# Patient Record
Sex: MALE | Race: WHITE | NOT HISPANIC OR LATINO | Employment: FULL TIME | URBAN - NONMETROPOLITAN AREA
[De-identification: names, ages, dates, MRNs, and addresses within clinical notes are randomized per-mention and may not be internally consistent; named-entity substitution may affect disease eponyms.]

---

## 2024-09-02 ENCOUNTER — APPOINTMENT (OUTPATIENT)
Dept: GENERAL RADIOLOGY | Facility: HOSPITAL | Age: 39
End: 2024-09-02
Payer: COMMERCIAL

## 2024-09-02 ENCOUNTER — HOSPITAL ENCOUNTER (EMERGENCY)
Facility: HOSPITAL | Age: 39
Discharge: HOME | End: 2024-09-02
Attending: STUDENT IN AN ORGANIZED HEALTH CARE EDUCATION/TRAINING PROGRAM
Payer: COMMERCIAL

## 2024-09-02 VITALS
OXYGEN SATURATION: 98 % | RESPIRATION RATE: 20 BRPM | HEART RATE: 94 BPM | DIASTOLIC BLOOD PRESSURE: 72 MMHG | WEIGHT: 215 LBS | SYSTOLIC BLOOD PRESSURE: 116 MMHG | TEMPERATURE: 98.7 F | HEIGHT: 72 IN | BODY MASS INDEX: 29.12 KG/M2

## 2024-09-02 DIAGNOSIS — J06.9 VIRAL URI WITH COUGH: Primary | ICD-10-CM

## 2024-09-02 LAB
ALBUMIN SERPL-MCNC: 3.6 G/DL (ref 3.5–5.2)
ALBUMIN/GLOB SERPL: 1.2 G/DL
ALP SERPL-CCNC: 67 U/L (ref 39–117)
ALT SERPL W P-5'-P-CCNC: 31 U/L (ref 1–41)
ANION GAP SERPL CALCULATED.3IONS-SCNC: 11.6 MMOL/L (ref 5–15)
AST SERPL-CCNC: 23 U/L (ref 1–40)
BACTERIA UR QL AUTO: ABNORMAL /HPF
BASOPHILS # BLD AUTO: 0.05 10*3/MM3 (ref 0–0.2)
BASOPHILS NFR BLD AUTO: 0.4 % (ref 0–1.5)
BILIRUB SERPL-MCNC: 0.6 MG/DL (ref 0–1.2)
BILIRUB UR QL STRIP: ABNORMAL
BUN SERPL-MCNC: 10 MG/DL (ref 6–20)
BUN/CREAT SERPL: 9.3 (ref 7–25)
CALCIUM SPEC-SCNC: 8.3 MG/DL (ref 8.6–10.5)
CHLORIDE SERPL-SCNC: 102 MMOL/L (ref 98–107)
CLARITY UR: CLEAR
CO2 SERPL-SCNC: 21.4 MMOL/L (ref 22–29)
COLOR UR: ABNORMAL
CREAT SERPL-MCNC: 1.07 MG/DL (ref 0.76–1.27)
CRP SERPL-MCNC: 10.19 MG/DL (ref 0–0.5)
D-LACTATE SERPL-SCNC: 1.4 MMOL/L (ref 0.5–2)
DEPRECATED RDW RBC AUTO: 42.3 FL (ref 37–54)
EGFRCR SERPLBLD CKD-EPI 2021: 91.1 ML/MIN/1.73
EOSINOPHIL # BLD AUTO: 0.02 10*3/MM3 (ref 0–0.4)
EOSINOPHIL NFR BLD AUTO: 0.2 % (ref 0.3–6.2)
ERYTHROCYTE [DISTWIDTH] IN BLOOD BY AUTOMATED COUNT: 13.1 % (ref 12.3–15.4)
FLUAV RNA RESP QL NAA+PROBE: NOT DETECTED
FLUBV RNA RESP QL NAA+PROBE: NOT DETECTED
GLOBULIN UR ELPH-MCNC: 2.9 GM/DL
GLUCOSE SERPL-MCNC: 117 MG/DL (ref 65–99)
GLUCOSE UR STRIP-MCNC: NEGATIVE MG/DL
HCT VFR BLD AUTO: 45.4 % (ref 37.5–51)
HGB BLD-MCNC: 15.6 G/DL (ref 13–17.7)
HGB UR QL STRIP.AUTO: ABNORMAL
IMM GRANULOCYTES # BLD AUTO: 0.05 10*3/MM3 (ref 0–0.05)
IMM GRANULOCYTES NFR BLD AUTO: 0.4 % (ref 0–0.5)
KETONES UR QL STRIP: ABNORMAL
LEUKOCYTE ESTERASE UR QL STRIP.AUTO: ABNORMAL
LIPASE SERPL-CCNC: 36 U/L (ref 13–60)
LYMPHOCYTES # BLD AUTO: 1.9 10*3/MM3 (ref 0.7–3.1)
LYMPHOCYTES NFR BLD AUTO: 16.5 % (ref 19.6–45.3)
MAGNESIUM SERPL-MCNC: 1.6 MG/DL (ref 1.6–2.6)
MCH RBC QN AUTO: 30.1 PG (ref 26.6–33)
MCHC RBC AUTO-ENTMCNC: 34.4 G/DL (ref 31.5–35.7)
MCV RBC AUTO: 87.6 FL (ref 79–97)
MONOCYTES # BLD AUTO: 1.2 10*3/MM3 (ref 0.1–0.9)
MONOCYTES NFR BLD AUTO: 10.4 % (ref 5–12)
MUCOUS THREADS URNS QL MICRO: ABNORMAL /HPF
NEUTROPHILS NFR BLD AUTO: 72.1 % (ref 42.7–76)
NEUTROPHILS NFR BLD AUTO: 8.33 10*3/MM3 (ref 1.7–7)
NITRITE UR QL STRIP: NEGATIVE
NRBC BLD AUTO-RTO: 0 /100 WBC (ref 0–0.2)
NT-PROBNP SERPL-MCNC: <36 PG/ML (ref 0–450)
PH UR STRIP.AUTO: 5.5 [PH] (ref 5–8)
PLATELET # BLD AUTO: 254 10*3/MM3 (ref 140–450)
PMV BLD AUTO: 8.8 FL (ref 6–12)
POTASSIUM SERPL-SCNC: 4 MMOL/L (ref 3.5–5.2)
PROCALCITONIN SERPL-MCNC: 0.11 NG/ML (ref 0–0.25)
PROT SERPL-MCNC: 6.5 G/DL (ref 6–8.5)
PROT UR QL STRIP: ABNORMAL
RBC # BLD AUTO: 5.18 10*6/MM3 (ref 4.14–5.8)
RBC # UR STRIP: ABNORMAL /HPF
REF LAB TEST METHOD: ABNORMAL
SARS-COV-2 RNA RESP QL NAA+PROBE: NOT DETECTED
SODIUM SERPL-SCNC: 135 MMOL/L (ref 136–145)
SP GR UR STRIP: 1.03 (ref 1–1.03)
SQUAMOUS #/AREA URNS HPF: ABNORMAL /HPF
TROPONIN T SERPL HS-MCNC: <6 NG/L
UROBILINOGEN UR QL STRIP: ABNORMAL
WBC # UR STRIP: ABNORMAL /HPF
WBC NRBC COR # BLD AUTO: 11.55 10*3/MM3 (ref 3.4–10.8)

## 2024-09-02 PROCEDURE — 83605 ASSAY OF LACTIC ACID: CPT | Performed by: STUDENT IN AN ORGANIZED HEALTH CARE EDUCATION/TRAINING PROGRAM

## 2024-09-02 PROCEDURE — 71045 X-RAY EXAM CHEST 1 VIEW: CPT

## 2024-09-02 PROCEDURE — 84145 PROCALCITONIN (PCT): CPT | Performed by: STUDENT IN AN ORGANIZED HEALTH CARE EDUCATION/TRAINING PROGRAM

## 2024-09-02 PROCEDURE — 83690 ASSAY OF LIPASE: CPT | Performed by: STUDENT IN AN ORGANIZED HEALTH CARE EDUCATION/TRAINING PROGRAM

## 2024-09-02 PROCEDURE — 85025 COMPLETE CBC W/AUTO DIFF WBC: CPT | Performed by: STUDENT IN AN ORGANIZED HEALTH CARE EDUCATION/TRAINING PROGRAM

## 2024-09-02 PROCEDURE — 99284 EMERGENCY DEPT VISIT MOD MDM: CPT

## 2024-09-02 PROCEDURE — 83880 ASSAY OF NATRIURETIC PEPTIDE: CPT | Performed by: STUDENT IN AN ORGANIZED HEALTH CARE EDUCATION/TRAINING PROGRAM

## 2024-09-02 PROCEDURE — 81001 URINALYSIS AUTO W/SCOPE: CPT | Performed by: STUDENT IN AN ORGANIZED HEALTH CARE EDUCATION/TRAINING PROGRAM

## 2024-09-02 PROCEDURE — 87636 SARSCOV2 & INF A&B AMP PRB: CPT | Performed by: STUDENT IN AN ORGANIZED HEALTH CARE EDUCATION/TRAINING PROGRAM

## 2024-09-02 PROCEDURE — 80053 COMPREHEN METABOLIC PANEL: CPT | Performed by: STUDENT IN AN ORGANIZED HEALTH CARE EDUCATION/TRAINING PROGRAM

## 2024-09-02 PROCEDURE — 86140 C-REACTIVE PROTEIN: CPT | Performed by: STUDENT IN AN ORGANIZED HEALTH CARE EDUCATION/TRAINING PROGRAM

## 2024-09-02 PROCEDURE — 84484 ASSAY OF TROPONIN QUANT: CPT | Performed by: STUDENT IN AN ORGANIZED HEALTH CARE EDUCATION/TRAINING PROGRAM

## 2024-09-02 PROCEDURE — 25810000003 SODIUM CHLORIDE 0.9 % SOLUTION: Performed by: STUDENT IN AN ORGANIZED HEALTH CARE EDUCATION/TRAINING PROGRAM

## 2024-09-02 PROCEDURE — 93005 ELECTROCARDIOGRAM TRACING: CPT | Performed by: STUDENT IN AN ORGANIZED HEALTH CARE EDUCATION/TRAINING PROGRAM

## 2024-09-02 PROCEDURE — 83735 ASSAY OF MAGNESIUM: CPT | Performed by: STUDENT IN AN ORGANIZED HEALTH CARE EDUCATION/TRAINING PROGRAM

## 2024-09-02 RX ORDER — ACETAMINOPHEN 500 MG
1000 TABLET ORAL ONCE
Status: COMPLETED | OUTPATIENT
Start: 2024-09-02 | End: 2024-09-02

## 2024-09-02 RX ADMIN — SODIUM CHLORIDE 1000 ML: 9 INJECTION, SOLUTION INTRAVENOUS at 08:19

## 2024-09-02 RX ADMIN — ACETAMINOPHEN 1000 MG: 500 TABLET, FILM COATED ORAL at 07:56

## 2024-09-02 NOTE — ED PROVIDER NOTES
Subjective:  History of Present Illness:    Patient is a 39-year-old male with no significant medical history who presents today with fever, malaise.  Reports onset of symptoms 3 days ago.  States that he tested negative for COVID and flu however, is having persistent fevers.  Resolved with Tylenol at home.  States that this morning, he took his temperature and had a high fever.  Did not take any antipyretics and presented to our emergency department.  Denies any chest pain or shortness of breath.  Has had mild cough and congestion.  Denies any abdominal pain nausea vomiting or diarrhea.  No dysuria.  Denies any new leg swelling or leg pain.      Nurses Notes reviewed and agree, including vitals, allergies, social history and prior medical history.     REVIEW OF SYSTEMS: All systems reviewed and not pertinent unless noted.  Review of Systems   Constitutional:  Positive for activity change, chills, fatigue and fever. Negative for appetite change.   HENT:  Positive for congestion and rhinorrhea. Negative for sinus pressure, sneezing and trouble swallowing.    Eyes:  Negative for discharge and itching.   Respiratory:  Positive for cough. Negative for shortness of breath.    Cardiovascular:  Negative for chest pain and palpitations.   Gastrointestinal:  Negative for abdominal distention, abdominal pain, diarrhea, nausea and vomiting.   Endocrine: Negative for cold intolerance and heat intolerance.   Genitourinary:  Negative for decreased urine volume, dysuria and urgency.   Musculoskeletal:  Negative for gait problem, neck pain and neck stiffness.   Skin:  Negative for color change and rash.   Allergic/Immunologic: Negative for immunocompromised state.   Neurological:  Negative for facial asymmetry and headaches.   Hematological:  Negative for adenopathy.   Psychiatric/Behavioral:  Negative for self-injury and suicidal ideas.        History reviewed. No pertinent past medical  "history.    Allergies:    Amoxicillin      History reviewed. No pertinent surgical history.      Social History     Socioeconomic History    Marital status:    Tobacco Use    Smoking status: Never    Smokeless tobacco: Never   Vaping Use    Vaping status: Never Used   Substance and Sexual Activity    Drug use: Never    Sexual activity: Defer         History reviewed. No pertinent family history.    Objective  Physical Exam:  /72 (BP Location: Left arm, Patient Position: Sitting)   Pulse 94   Temp 98.7 °F (37.1 °C) (Oral)   Resp 20   Ht 182.9 cm (72\")   Wt 97.5 kg (215 lb)   SpO2 98%   BMI 29.16 kg/m²      Physical Exam  Constitutional:       General: He is not in acute distress.     Appearance: Normal appearance. He is normal weight. He is not ill-appearing.   HENT:      Head: Normocephalic and atraumatic.      Nose: Nose normal. Congestion present. No rhinorrhea.      Mouth/Throat:      Mouth: Mucous membranes are moist.      Pharynx: Oropharynx is clear.   Eyes:      Extraocular Movements: Extraocular movements intact.      Conjunctiva/sclera: Conjunctivae normal.      Pupils: Pupils are equal, round, and reactive to light.   Cardiovascular:      Rate and Rhythm: Regular rhythm. Tachycardia present.      Pulses: Normal pulses.   Pulmonary:      Effort: Pulmonary effort is normal. No respiratory distress.      Breath sounds: Normal breath sounds.   Abdominal:      General: Abdomen is flat. Bowel sounds are normal. There is no distension.      Palpations: Abdomen is soft.      Tenderness: There is no abdominal tenderness. There is no right CVA tenderness, left CVA tenderness, guarding or rebound.   Musculoskeletal:         General: No swelling or tenderness. Normal range of motion.      Cervical back: Normal range of motion and neck supple. No rigidity or tenderness.      Right lower leg: No edema.      Left lower leg: No edema.   Skin:     General: Skin is warm and dry.      Capillary Refill: " Capillary refill takes less than 2 seconds.   Neurological:      General: No focal deficit present.      Mental Status: He is alert and oriented to person, place, and time. Mental status is at baseline.      Cranial Nerves: No cranial nerve deficit.      Sensory: No sensory deficit.      Motor: No weakness.   Psychiatric:         Mood and Affect: Mood normal.         Behavior: Behavior normal.         Thought Content: Thought content normal.         Judgment: Judgment normal.         Procedures    ED Course:    ED Course as of 09/02/24 1438   Mon Sep 02, 2024   0827 EKG interpreted by me, last tachycardia with no concerning ST changes noted, rate of 103 [JE]      ED Course User Index  [JE] Santos Main MD       Lab Results (last 24 hours)       Procedure Component Value Units Date/Time    CBC & Differential [[id number]]  (Abnormal) Collected: 09/02/24 0817    Specimen: Blood Updated: 09/02/24 0823    Narrative:      The following orders were created for panel order CBC & Differential.  Procedure                               Abnormality         Status                     ---------                               -----------         ------                     CBC Auto Differential[[id number]]        Abnormal            Final result                 Please view results for these tests on the individual orders.    Comprehensive Metabolic Panel [[id number]]  (Abnormal) Collected: 09/02/24 0817    Specimen: Blood Updated: 09/02/24 0843     Glucose 117 mg/dL      BUN 10 mg/dL      Creatinine 1.07 mg/dL      Sodium 135 mmol/L      Potassium 4.0 mmol/L      Chloride 102 mmol/L      CO2 21.4 mmol/L      Calcium 8.3 mg/dL      Total Protein 6.5 g/dL      Albumin 3.6 g/dL      ALT (SGPT) 31 U/L      AST (SGOT) 23 U/L      Alkaline Phosphatase 67 U/L      Total Bilirubin 0.6 mg/dL      Globulin 2.9 gm/dL      A/G Ratio 1.2 g/dL      BUN/Creatinine Ratio 9.3     Anion Gap 11.6 mmol/L      eGFR 91.1 mL/min/1.73     Narrative:       GFR Normal >60  Chronic Kidney Disease <60  Kidney Failure <15      Lipase [[id number]]  (Normal) Collected: 09/02/24 0817    Specimen: Blood Updated: 09/02/24 0843     Lipase 36 U/L     High Sensitivity Troponin T [[id number]]  (Normal) Collected: 09/02/24 0817    Specimen: Blood Updated: 09/02/24 0847     HS Troponin T <6 ng/L     Narrative:      High Sensitive Troponin T Reference Range:  <14.0 ng/L- Negative Female for AMI  <22.0 ng/L- Negative Male for AMI  >=14 - Abnormal Female indicating possible myocardial injury.  >=22 - Abnormal Male indicating possible myocardial injury.   Clinicians would have to utilize clinical acumen, EKG, Troponin, and serial changes to determine if it is an Acute Myocardial Infarction or myocardial injury due to an underlying chronic condition.         BNP [[id number]]  (Normal) Collected: 09/02/24 0817    Specimen: Blood Updated: 09/02/24 0848     proBNP <36.0 pg/mL     Narrative:      This assay is used as an aid in the diagnosis of individuals suspected of having heart failure. It can be used as an aid in the diagnosis of acute decompensated heart failure (ADHF) in patients presenting with signs and symptoms of ADHF to the emergency department (ED). In addition, NT-proBNP of <300 pg/mL indicates ADHF is not likely.    Age Range Result Interpretation  NT-proBNP Concentration (pg/mL:      <50             Positive            >450                   Gray                 300-450                    Negative             <300    50-75           Positive            >900                  Gray                300-900                  Negative            <300      >75             Positive            >1800                  Gray                300-1800                  Negative            <300    C-reactive Protein [[id number]]  (Abnormal) Collected: 09/02/24 0817    Specimen: Blood Updated: 09/02/24 0843     C-Reactive Protein 10.19 mg/dL     Lactic Acid, Plasma [[id number]]  (Normal) Collected:  "09/02/24 0817    Specimen: Blood Updated: 09/02/24 0841     Lactate 1.4 mmol/L     Procalcitonin [[id number]]  (Normal) Collected: 09/02/24 0817    Specimen: Blood Updated: 09/02/24 0851     Procalcitonin 0.11 ng/mL     Narrative:      As a Marker for Sepsis (Non-Neonates):    1. <0.5 ng/mL represents a low risk of severe sepsis and/or septic shock.  2. >2 ng/mL represents a high risk of severe sepsis and/or septic shock.    As a Marker for Lower Respiratory Tract Infections that require antibiotic therapy:    PCT on Admission    Antibiotic Therapy       6-12 Hrs later    >0.5                Strongly Recommended  >0.25 - <0.5        Recommended   0.1 - 0.25          Discouraged              Remeasure/reassess PCT  <0.1                Strongly Discouraged     Remeasure/reassess PCT    As 28 day mortality risk marker: \"Change in Procalcitonin Result\" (>80% or <=80%) if Day 0 (or Day 1) and Day 4 values are available. Refer to http://[email or web address]-pct-calculator.[email or web address]    Change in PCT <=80%  A decrease of PCT levels below or equal to 80% defines a positive change in PCT test result representing a higher risk for 28-day all-cause mortality of patients diagnosed with severe sepsis for septic shock.    Change in PCT >80%  A decrease of PCT levels of more than 80% defines a negative change in PCT result representing a lower risk for 28-day all-cause mortality of patients diagnosed with severe sepsis or septic shock.       Magnesium [[id number]]  (Normal) Collected: 09/02/24 0817    Specimen: Blood Updated: 09/02/24 0843     Magnesium 1.6 mg/dL     CBC Auto Differential [[id number]]  (Abnormal) Collected: 09/02/24 0817    Specimen: Blood Updated: 09/02/24 0823     WBC 11.55 10*3/mm3      RBC 5.18 10*6/mm3      Hemoglobin 15.6 g/dL      Hematocrit 45.4 %      MCV 87.6 fL      MCH 30.1 pg      MCHC 34.4 g/dL      RDW 13.1 %      RDW-SD 42.3 fl      MPV 8.8 fL      Platelets 254 10*3/mm3      Neutrophil % 72.1 %      Lymphocyte % 16.5 " %      Monocyte % 10.4 %      Eosinophil % 0.2 %      Basophil % 0.4 %      Immature Grans % 0.4 %      Neutrophils, Absolute 8.33 10*3/mm3      Lymphocytes, Absolute 1.90 10*3/mm3      Monocytes, Absolute 1.20 10*3/mm3      Eosinophils, Absolute 0.02 10*3/mm3      Basophils, Absolute 0.05 10*3/mm3      Immature Grans, Absolute 0.05 10*3/mm3      nRBC 0.0 /100 WBC     COVID-19 and FLU A/B PCR, 1 HR TAT - Swab, Nasopharynx [[id number]]  (Normal) Collected: 09/02/24 0832    Specimen: Swab from Nasopharynx Updated: 09/02/24 0854     COVID19 Not Detected     Influenza A PCR Not Detected     Influenza B PCR Not Detected    Narrative:      Fact sheet for providers: https://www.fda.gov/media/100877/download    Fact sheet for patients: https://www.fda.gov/media/253799/download    Test performed by PCR.    Urinalysis With Culture If Indicated - Urine, Clean Catch [[id number]]  (Abnormal) Collected: 09/02/24 0835    Specimen: Urine, Clean Catch Updated: 09/02/24 0847     Color, UA Dark Yellow     Appearance, UA Clear     pH, UA 5.5     Specific Gravity, UA 1.028     Glucose, UA Negative     Ketones, UA Trace     Bilirubin, UA Small (1+)     Blood, UA Small (1+)     Protein,  mg/dL (2+)     Leuk Esterase, UA Trace     Nitrite, UA Negative     Urobilinogen, UA 1.0 E.U./dL    Narrative:      In absence of clinical symptoms, the presence of pyuria, bacteria, and/or nitrites on the urinalysis result does not correlate with infection.    Urinalysis, Microscopic Only - Urine, Clean Catch [[id number]]  (Abnormal) Collected: 09/02/24 0835    Specimen: Urine, Clean Catch Updated: 09/02/24 0852     RBC, UA 3-5 /HPF      WBC, UA 3-5 /HPF      Comment: Urine culture not indicated.        Bacteria, UA 1+ /HPF      Squamous Epithelial Cells, UA None Seen /HPF      Mucus, UA Moderate/2+ /HPF      Methodology Manual Light Microscopy             XR Chest 1 View    Result Date: 9/2/2024  PROCEDURE: XR CHEST 1 VW-  HISTORY: fever eval PNA,  body aches  COMPARISON: None.  FINDINGS: The heart is normal in size. The lungs are clear. The mediastinum is unremarkable. There is no pneumothorax.  There are no acute osseous abnormalities. Apical lordotic positioning noted.      Impression: No acute cardiopulmonary process.     This report was signed and finalized on 9/2/2024 8:48 AM by Liliana Cruz MD.          MDM      Initial impression of presenting illness: Fever    DDX: includes but is not limited to: Viral URI, bacterial pneumonia, urinary tract infection, sepsis    Patient arrives stable with vitals interpreted by myself.     Pertinent features from physical exam: To auscultation, regular rate and rhythm, no murmur, nontender to abdominal palpation.    Initial diagnostic plan: CBC, CMP, troponin, EKG, chest x-ray, CRP, Pro-Krishna, lactic acid, magnesium    Results from initial plan were reviewed and interpreted by me revealing no concern for significant bacterial process given lack of white count, lactic acid.  Suspect viral illness is etiology of his symptoms    Diagnostic information from other sources: Discussed with patient's wife at bedside reviewed past medical records    Interventions / Re-evaluation: Given Tylenol, IV fluids for symptom control    Results/clinical rationale were discussed with patient at bedside    Consultations/Discussion of results with other physicians: Suzan negative workup in emergency department, no concern for significant bacterial process at this time.  Suspect viral illness is etiology of his symptoms.  I have encouraged [place] ibuprofen use at home as well as oral hydration.  Given strict return precaution for chest pain, shortness of breath or worsening function.  Encouraged him to follow-up with her primary care doctor to ensure resolution of the symptoms.  Patient and significant other at bedside both voiced understanding and agreement with the plan.    Disposition plan: Discharge  -----        Final diagnoses:    Viral URI with cough          Santos Main MD  09/02/24 7857

## 2024-09-02 NOTE — Clinical Note
[facility] EMERGENCY DEPARTMENT  [address] [address]  Phone: [phone]    Yoni Dumas was seen and treated in our emergency department on 9/2/2024.  He may return to work on 09/06/2024.         Thank you for choosing [facility].    Santos Main MD

## 2024-09-02 NOTE — DISCHARGE INSTRUCTIONS
You were evaluated due to fever and bodyaches.  We got labs and a chest x-ray that showed no concerns for significant infection or pneumonia.  You are now stable for discharge.  As we discussed, we believe her symptoms are likely related to a viral illness.  We would recommend Tylenol and ibuprofen at home for fever chills and bodyaches and recommend drinking plenty of fluids to prevent dehydration.  Would recommend following with primary care doctor to ensure that you improve appropriately.  If you have significant chest pain or shortness of breath or significantly worsening function please come back to the to Emergency Department for further evaluation.  You are now stable for discharge.

## 2024-09-02 NOTE — Clinical Note
[facility] EMERGENCY DEPARTMENT  [address] [address]  Phone: [phone]    Yoni Dumas was seen and treated in our emergency department on 9/2/2024.  He may return to work on 09/06/2024.         Thank you for choosing [facility].    Santos Main MD

## 2024-09-03 ENCOUNTER — NURSE TRIAGE (OUTPATIENT)
Dept: CALL CENTER | Facility: HOSPITAL | Age: 39
End: 2024-09-03
Payer: COMMERCIAL

## 2024-09-03 ENCOUNTER — APPOINTMENT (OUTPATIENT)
Dept: GENERAL RADIOLOGY | Facility: HOSPITAL | Age: 39
End: 2024-09-03
Payer: COMMERCIAL

## 2024-09-03 ENCOUNTER — HOSPITAL ENCOUNTER (EMERGENCY)
Facility: HOSPITAL | Age: 39
Discharge: HOME | End: 2024-09-03
Attending: STUDENT IN AN ORGANIZED HEALTH CARE EDUCATION/TRAINING PROGRAM
Payer: COMMERCIAL

## 2024-09-03 ENCOUNTER — APPOINTMENT (OUTPATIENT)
Dept: CT IMAGING | Facility: HOSPITAL | Age: 39
End: 2024-09-03
Payer: COMMERCIAL

## 2024-09-03 VITALS
HEIGHT: 72 IN | TEMPERATURE: 99.1 F | BODY MASS INDEX: 29.12 KG/M2 | DIASTOLIC BLOOD PRESSURE: 70 MMHG | RESPIRATION RATE: 18 BRPM | OXYGEN SATURATION: 97 % | HEART RATE: 88 BPM | SYSTOLIC BLOOD PRESSURE: 114 MMHG | WEIGHT: 215 LBS

## 2024-09-03 DIAGNOSIS — J18.9 PNEUMONIA OF LEFT LOWER LOBE DUE TO INFECTIOUS ORGANISM: Primary | ICD-10-CM

## 2024-09-03 LAB
ALBUMIN SERPL-MCNC: 3.5 G/DL (ref 3.5–5.2)
ALBUMIN/GLOB SERPL: 1 G/DL
ALP SERPL-CCNC: 73 U/L (ref 39–117)
ALT SERPL W P-5'-P-CCNC: 54 U/L (ref 1–41)
ANION GAP SERPL CALCULATED.3IONS-SCNC: 13 MMOL/L (ref 5–15)
AST SERPL-CCNC: 43 U/L (ref 1–40)
B PARAPERT DNA SPEC QL NAA+PROBE: NOT DETECTED
B PERT DNA SPEC QL NAA+PROBE: NOT DETECTED
BACTERIA UR QL AUTO: NORMAL /HPF
BASOPHILS # BLD AUTO: 0.05 10*3/MM3 (ref 0–0.2)
BASOPHILS NFR BLD AUTO: 0.5 % (ref 0–1.5)
BILIRUB SERPL-MCNC: 0.4 MG/DL (ref 0–1.2)
BILIRUB UR QL STRIP: NEGATIVE
BUN SERPL-MCNC: 7 MG/DL (ref 6–20)
BUN/CREAT SERPL: 7.2 (ref 7–25)
C PNEUM DNA NPH QL NAA+NON-PROBE: NOT DETECTED
CALCIUM SPEC-SCNC: 8.5 MG/DL (ref 8.6–10.5)
CHLORIDE SERPL-SCNC: 99 MMOL/L (ref 98–107)
CLARITY UR: CLEAR
CO2 SERPL-SCNC: 23 MMOL/L (ref 22–29)
COLOR UR: ABNORMAL
CREAT SERPL-MCNC: 0.97 MG/DL (ref 0.76–1.27)
DEPRECATED RDW RBC AUTO: 42.8 FL (ref 37–54)
EGFRCR SERPLBLD CKD-EPI 2021: 102.5 ML/MIN/1.73
EOSINOPHIL # BLD AUTO: 0.01 10*3/MM3 (ref 0–0.4)
EOSINOPHIL NFR BLD AUTO: 0.1 % (ref 0.3–6.2)
ERYTHROCYTE [DISTWIDTH] IN BLOOD BY AUTOMATED COUNT: 13.1 % (ref 12.3–15.4)
FLUAV SUBTYP SPEC NAA+PROBE: NOT DETECTED
FLUBV RNA ISLT QL NAA+PROBE: NOT DETECTED
GLOBULIN UR ELPH-MCNC: 3.4 GM/DL
GLUCOSE SERPL-MCNC: 118 MG/DL (ref 65–99)
GLUCOSE UR STRIP-MCNC: NEGATIVE MG/DL
HADV DNA SPEC NAA+PROBE: NOT DETECTED
HCOV 229E RNA SPEC QL NAA+PROBE: NOT DETECTED
HCOV HKU1 RNA SPEC QL NAA+PROBE: NOT DETECTED
HCOV NL63 RNA SPEC QL NAA+PROBE: NOT DETECTED
HCOV OC43 RNA SPEC QL NAA+PROBE: NOT DETECTED
HCT VFR BLD AUTO: 43.5 % (ref 37.5–51)
HGB BLD-MCNC: 14.7 G/DL (ref 13–17.7)
HGB UR QL STRIP.AUTO: ABNORMAL
HMPV RNA NPH QL NAA+NON-PROBE: NOT DETECTED
HPIV1 RNA ISLT QL NAA+PROBE: NOT DETECTED
HPIV2 RNA SPEC QL NAA+PROBE: NOT DETECTED
HPIV3 RNA NPH QL NAA+PROBE: NOT DETECTED
HPIV4 P GENE NPH QL NAA+PROBE: NOT DETECTED
HYALINE CASTS UR QL AUTO: NORMAL /LPF
IMM GRANULOCYTES # BLD AUTO: 0.05 10*3/MM3 (ref 0–0.05)
IMM GRANULOCYTES NFR BLD AUTO: 0.5 % (ref 0–0.5)
KETONES UR QL STRIP: ABNORMAL
LEUKOCYTE ESTERASE UR QL STRIP.AUTO: NEGATIVE
LYMPHOCYTES # BLD AUTO: 1.89 10*3/MM3 (ref 0.7–3.1)
LYMPHOCYTES NFR BLD AUTO: 17.6 % (ref 19.6–45.3)
M PNEUMO IGG SER IA-ACNC: NOT DETECTED
MCH RBC QN AUTO: 30.1 PG (ref 26.6–33)
MCHC RBC AUTO-ENTMCNC: 33.8 G/DL (ref 31.5–35.7)
MCV RBC AUTO: 89.1 FL (ref 79–97)
MONOCYTES # BLD AUTO: 0.82 10*3/MM3 (ref 0.1–0.9)
MONOCYTES NFR BLD AUTO: 7.7 % (ref 5–12)
NEUTROPHILS NFR BLD AUTO: 7.89 10*3/MM3 (ref 1.7–7)
NEUTROPHILS NFR BLD AUTO: 73.6 % (ref 42.7–76)
NITRITE UR QL STRIP: NEGATIVE
NRBC BLD AUTO-RTO: 0 /100 WBC (ref 0–0.2)
PH UR STRIP.AUTO: 6 [PH] (ref 5–8)
PLATELET # BLD AUTO: 215 10*3/MM3 (ref 140–450)
PMV BLD AUTO: 8.9 FL (ref 6–12)
POTASSIUM SERPL-SCNC: 4 MMOL/L (ref 3.5–5.2)
PROCALCITONIN SERPL-MCNC: 0.14 NG/ML (ref 0–0.25)
PROT SERPL-MCNC: 6.9 G/DL (ref 6–8.5)
PROT UR QL STRIP: ABNORMAL
RBC # BLD AUTO: 4.88 10*6/MM3 (ref 4.14–5.8)
RBC # UR STRIP: NORMAL /HPF
REF LAB TEST METHOD: NORMAL
RHINOVIRUS RNA SPEC NAA+PROBE: NOT DETECTED
RSV RNA NPH QL NAA+NON-PROBE: NOT DETECTED
S PYO AG THROAT QL: NEGATIVE
SARS-COV-2 RNA NPH QL NAA+NON-PROBE: NOT DETECTED
SODIUM SERPL-SCNC: 135 MMOL/L (ref 136–145)
SP GR UR STRIP: 1.02 (ref 1–1.03)
SQUAMOUS #/AREA URNS HPF: NORMAL /HPF
UROBILINOGEN UR QL STRIP: ABNORMAL
WBC # UR STRIP: NORMAL /HPF
WBC NRBC COR # BLD AUTO: 10.71 10*3/MM3 (ref 3.4–10.8)

## 2024-09-03 PROCEDURE — 87880 STREP A ASSAY W/OPTIC: CPT

## 2024-09-03 PROCEDURE — 36415 COLL VENOUS BLD VENIPUNCTURE: CPT

## 2024-09-03 PROCEDURE — 25810000003 SODIUM CHLORIDE 0.9 % SOLUTION

## 2024-09-03 PROCEDURE — 0202U NFCT DS 22 TRGT SARS-COV-2: CPT | Performed by: STUDENT IN AN ORGANIZED HEALTH CARE EDUCATION/TRAINING PROGRAM

## 2024-09-03 PROCEDURE — 71045 X-RAY EXAM CHEST 1 VIEW: CPT

## 2024-09-03 PROCEDURE — 25010000002 DEXAMETHASONE SODIUM PHOSPHATE 10 MG/ML SOLUTION

## 2024-09-03 PROCEDURE — 74177 CT ABD & PELVIS W/CONTRAST: CPT

## 2024-09-03 PROCEDURE — 96361 HYDRATE IV INFUSION ADD-ON: CPT

## 2024-09-03 PROCEDURE — 84145 PROCALCITONIN (PCT): CPT

## 2024-09-03 PROCEDURE — 80053 COMPREHEN METABOLIC PANEL: CPT

## 2024-09-03 PROCEDURE — 96374 THER/PROPH/DIAG INJ IV PUSH: CPT

## 2024-09-03 PROCEDURE — 85025 COMPLETE CBC W/AUTO DIFF WBC: CPT

## 2024-09-03 PROCEDURE — 81001 URINALYSIS AUTO W/SCOPE: CPT

## 2024-09-03 PROCEDURE — 71260 CT THORAX DX C+: CPT

## 2024-09-03 PROCEDURE — 96375 TX/PRO/DX INJ NEW DRUG ADDON: CPT

## 2024-09-03 PROCEDURE — 25010000002 KETOROLAC TROMETHAMINE PER 15 MG

## 2024-09-03 PROCEDURE — 87081 CULTURE SCREEN ONLY: CPT

## 2024-09-03 PROCEDURE — 25510000001 IOPAMIDOL 61 % SOLUTION: Performed by: STUDENT IN AN ORGANIZED HEALTH CARE EDUCATION/TRAINING PROGRAM

## 2024-09-03 PROCEDURE — 99285 EMERGENCY DEPT VISIT HI MDM: CPT

## 2024-09-03 RX ORDER — LEVOFLOXACIN 750 MG/1
750 TABLET, FILM COATED ORAL ONCE
Status: COMPLETED | OUTPATIENT
Start: 2024-09-03 | End: 2024-09-03

## 2024-09-03 RX ORDER — DEXAMETHASONE SODIUM PHOSPHATE 10 MG/ML
10 INJECTION, SOLUTION INTRAMUSCULAR; INTRAVENOUS ONCE
Status: COMPLETED | OUTPATIENT
Start: 2024-09-03 | End: 2024-09-03

## 2024-09-03 RX ORDER — ACETAMINOPHEN 325 MG/1
975 TABLET ORAL ONCE
Status: COMPLETED | OUTPATIENT
Start: 2024-09-03 | End: 2024-09-03

## 2024-09-03 RX ORDER — LEVOFLOXACIN 750 MG/1
750 TABLET, FILM COATED ORAL DAILY
Qty: 5 TABLET | Refills: 0 | Status: SHIPPED | OUTPATIENT
Start: 2024-09-03

## 2024-09-03 RX ORDER — KETOROLAC TROMETHAMINE 30 MG/ML
15 INJECTION, SOLUTION INTRAMUSCULAR; INTRAVENOUS ONCE
Status: COMPLETED | OUTPATIENT
Start: 2024-09-03 | End: 2024-09-03

## 2024-09-03 RX ORDER — IOPAMIDOL 612 MG/ML
100 INJECTION, SOLUTION INTRAVASCULAR
Status: COMPLETED | OUTPATIENT
Start: 2024-09-03 | End: 2024-09-03

## 2024-09-03 RX ADMIN — ACETAMINOPHEN 975 MG: 325 TABLET, FILM COATED ORAL at 19:07

## 2024-09-03 RX ADMIN — IOPAMIDOL 100 ML: 612 INJECTION, SOLUTION INTRAVENOUS at 21:14

## 2024-09-03 RX ADMIN — SODIUM CHLORIDE 1000 ML: 9 INJECTION, SOLUTION INTRAVENOUS at 19:32

## 2024-09-03 RX ADMIN — KETOROLAC TROMETHAMINE 15 MG: 30 INJECTION, SOLUTION INTRAMUSCULAR; INTRAVENOUS at 19:33

## 2024-09-03 RX ADMIN — DEXAMETHASONE SODIUM PHOSPHATE 10 MG: 10 INJECTION INTRAMUSCULAR; INTRAVENOUS at 19:34

## 2024-09-03 RX ADMIN — LEVOFLOXACIN 750 MG: 750 TABLET, FILM COATED ORAL at 22:53

## 2024-09-03 NOTE — ED PROVIDER NOTES
ED Course as of 09/03/24 2245   e Sep 03, 2024   6092 I have interviewed and examined the patient FACE-TO-FACE.  I reviewed the mid-level provider(s) note and agree with the clinical impression, plan, and disposition unless otherwise stated in the MDM below.    ATTENDING ATTESTATION  HPI: 39-year-old male presents with fever, cough and congestion.  Second ER visit for similar symptoms.    EXAM:   General: Alert.  Nontoxic appearance.  No acute distress.  Head: Normocephalic.  Atraumatic.  Eyes: No scleral icterus.  ENT: Moist mucous membranes.  Cardiovascular: Regular rate and rhythm.  No murmurs.  No rubs.  2+ distal pulses bilaterally.  Respiratory: Equal breath sounds bilaterally.  No rales.  No rhonchi.  No wheezing.  GI: Abdomen is soft.  Nondistended.  Nontender to palpation.  No rebound.  No guarding.  No CVA tenderness.  MSK: Moves all 4 extremities.  Neurologic: Oriented x 3.  No focal deficits.  Skin: No erythema.  No edema. No pallor. No cyanosis.  Psych: Normal mood and affect.     [JS]   1833   MDM: External notes reviewed.  Patient is in the ER yesterday.  ED workup reviewed.  CMP, lipase, troponin, proBNP clinic unremarkable.  Lactic acid 1.4.  Procalcitonin 0.1.  CBC shows mild leukocytosis with WBC 11.5.  Influenza and COVID test negative.  UA negative for infection.  Radiology report reviewed.  Chest x-ray no acute process.  Patient was discharged with outpatient follow-up.   [JS]   2196 I received signout on this patient from EDI at the end of their shift.  Disposition pending radiology report of CT chest abdomen pelvis and clinical re-evaluation.  Please see note below for complete details. [JS]   2244 ED workup reviewed.  CMP shows minimal elevation in ALT and AST. Labs and urinalysis otherwise clinically unremarkable.    I have independently reviewed and interpreted the CT chest abdomen pelvis.  My interpretation is left lower lobe pneumonia.   [JS]   9374 On re-evaluation, patient resting  comfortably.  States symptoms have improved following therapy. Vital signs remained stable on room air.  Patient was ambulatory in the ED with steady gait.  Able to tolerate oral intake appropriately.    I discussed the findings of the ED workup with the patient at bedside.  No clinical indication for admission.  I recommended outpatient follow-up with PCP.  Patient was deemed medically stable for discharge with close outpatient follow-up and strict ED return precautions. Patient agreeable with plan and disposition.    Home medications were reviewed.  Patient has allergy to penicillin. Prescriptions for discharge: Levaquin.    Chronic conditions affecting care: None    Social determinants of health impacting treatment or disposition: None       [JS]      ED Course User Index  [JS] Wiley Rodarte DO     Subjective  History of Present Illness:    This is a 39-year-old male presenting to the ER again today for evaluation of fever.  Patient was seen in this ED yesterday, no significant past medical history presenting for fever and generalized malaise.  He reports a cough productive of some green sputum, onset of symptoms now approximately 4 days ago.  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  Tested negative for COVID flu,, had workup done in the emergency department was discharged with viral URI.  He reports that he was at home today when his symptoms worsen in regards to fever and reports that his fever spiked to around 104.  He reports that he was due for his next dose of antipyretics at 6:00 but did not take them.  No chest pain or shortness of breath.  He reports a mildly sore throat but no difficulty in swallowing.  No abdominal pain nausea vomiting or diarrhea.  No dysuria hematuria urgency frequency or flank pain.  No rashes.  No neck pain or stiffness.      Nurses Notes reviewed and agree, including vitals, allergies, social history and prior medical history.  "    REVIEW OF SYSTEMS: All systems reviewed and not pertinent unless noted.  Review of Systems   Constitutional:  Positive for chills, fatigue and fever.   HENT:  Positive for sore throat. Negative for congestion.    Respiratory:  Positive for cough. Negative for shortness of breath.    Cardiovascular:  Negative for chest pain.   Gastrointestinal:  Negative for abdominal pain, diarrhea, nausea and vomiting.   Genitourinary:  Negative for dysuria, frequency, hematuria and urgency.   Neurological:  Positive for headaches.   All other systems reviewed and are negative.      History reviewed. No pertinent past medical history.    Allergies:    Amoxicillin      History reviewed. No pertinent surgical history.      Social History     Socioeconomic History    Marital status:    Tobacco Use    Smoking status: Never    Smokeless tobacco: Never   Vaping Use    Vaping status: Never Used   Substance and Sexual Activity    Drug use: Never    Sexual activity: Defer         History reviewed. No pertinent family history.    Objective  Physical Exam:  /75   Pulse 92   Temp 99.1 °F (37.3 °C) (Oral)   Resp 18   Ht 182.9 cm (72\")   Wt 97.5 kg (215 lb)   SpO2 94%   BMI 29.16 kg/m²      Physical Exam  Vitals and nursing note reviewed.   Constitutional:       General: He is not in acute distress.     Appearance: Normal appearance. He is normal weight. He is not ill-appearing, toxic-appearing or diaphoretic.   HENT:      Head: Normocephalic and atraumatic.      Nose: Nose normal.      Mouth/Throat:      Mouth: Mucous membranes are moist.      Pharynx: Oropharynx is clear. Posterior oropharyngeal erythema present. No oropharyngeal exudate.   Eyes:      Extraocular Movements: Extraocular movements intact.   Cardiovascular:      Rate and Rhythm: Normal rate and regular rhythm.      Pulses: Normal pulses.      Heart sounds: Normal heart sounds.   Pulmonary:      Effort: Pulmonary effort is normal.      Breath sounds: Normal " breath sounds.   Abdominal:      General: There is no distension.      Palpations: Abdomen is soft.      Tenderness: There is no abdominal tenderness. There is no guarding or rebound.   Musculoskeletal:         General: Normal range of motion.      Cervical back: Normal range of motion.   Skin:     General: Skin is warm and dry.      Capillary Refill: Capillary refill takes less than 2 seconds.   Neurological:      General: No focal deficit present.      Mental Status: He is alert and oriented to person, place, and time.   Psychiatric:         Mood and Affect: Mood normal.         Behavior: Behavior normal.         Thought Content: Thought content normal.         Judgment: Judgment normal.             Procedures    Lab Results (last 24 hours)       Procedure Component Value Units Date/Time    Respiratory Panel PCR w/COVID-19(SARS-CoV-2) SILVINA/SUE/CARLITOS/PAD/COR/KARTHIKEYAN In-House, NP Swab in UTM/VTM, 2 HR TAT - Swab, Nasopharynx [[id number]]  (Normal) Collected: 09/03/24 1901    Specimen: Swab from Nasopharynx Updated: 09/03/24 1958     ADENOVIRUS, PCR Not Detected     Coronavirus 229E Not Detected     Coronavirus HKU1 Not Detected     Coronavirus NL63 Not Detected     Coronavirus OC43 Not Detected     COVID19 Not Detected     Human Metapneumovirus Not Detected     Human Rhinovirus/Enterovirus Not Detected     Influenza A PCR Not Detected     Influenza B PCR Not Detected     Parainfluenza Virus 1 Not Detected     Parainfluenza Virus 2 Not Detected     Parainfluenza Virus 3 Not Detected     Parainfluenza Virus 4 Not Detected     RSV, PCR Not Detected     Bordetella pertussis pcr Not Detected     Bordetella parapertussis PCR Not Detected     Chlamydophila pneumoniae PCR Not Detected     Mycoplasma pneumo by PCR Not Detected    Narrative:      In the setting of a positive respiratory panel with a viral infection PLUS a negative procalcitonin without other underlying concern for bacterial infection, consider observing off  antibiotics or discontinuation of antibiotics and continue supportive care. If the respiratory panel is positive for atypical bacterial infection (Bordetella pertussis, Chlamydophila pneumoniae, or Mycoplasma pneumoniae), consider antibiotic de-escalation to target atypical bacterial infection.    Rapid Strep A Screen - Swab, Throat [[id number]]  (Normal) Collected: 09/03/24 1901    Specimen: Swab from Throat Updated: 09/03/24 1939     Strep A Ag Negative    Beta Strep Culture, Throat - Swab, Throat [[id number]] Collected: 09/03/24 1901    Specimen: Swab from Throat Updated: 09/03/24 1939    CBC Auto Differential [[id number]]  (Abnormal) Collected: 09/03/24 1926    Specimen: Blood from Arm, Left Updated: 09/03/24 1935     WBC 10.71 10*3/mm3      RBC 4.88 10*6/mm3      Hemoglobin 14.7 g/dL      Hematocrit 43.5 %      MCV 89.1 fL      MCH 30.1 pg      MCHC 33.8 g/dL      RDW 13.1 %      RDW-SD 42.8 fl      MPV 8.9 fL      Platelets 215 10*3/mm3      Neutrophil % 73.6 %      Lymphocyte % 17.6 %      Monocyte % 7.7 %      Eosinophil % 0.1 %      Basophil % 0.5 %      Immature Grans % 0.5 %      Neutrophils, Absolute 7.89 10*3/mm3      Lymphocytes, Absolute 1.89 10*3/mm3      Monocytes, Absolute 0.82 10*3/mm3      Eosinophils, Absolute 0.01 10*3/mm3      Basophils, Absolute 0.05 10*3/mm3      Immature Grans, Absolute 0.05 10*3/mm3      nRBC 0.0 /100 WBC     Comprehensive Metabolic Panel [[id number]]  (Abnormal) Collected: 09/03/24 1926    Specimen: Blood from Arm, Left Updated: 09/03/24 1951     Glucose 118 mg/dL      BUN 7 mg/dL      Creatinine 0.97 mg/dL      Sodium 135 mmol/L      Potassium 4.0 mmol/L      Chloride 99 mmol/L      CO2 23.0 mmol/L      Calcium 8.5 mg/dL      Total Protein 6.9 g/dL      Albumin 3.5 g/dL      ALT (SGPT) 54 U/L      AST (SGOT) 43 U/L      Alkaline Phosphatase 73 U/L      Total Bilirubin 0.4 mg/dL      Globulin 3.4 gm/dL      A/G Ratio 1.0 g/dL      BUN/Creatinine Ratio 7.2     Anion Gap 13.0  "mmol/L      eGFR 102.5 mL/min/1.73     Narrative:      GFR Normal >60  Chronic Kidney Disease <60  Kidney Failure <15      Procalcitonin [[id number]]  (Normal) Collected: 09/03/24 1926    Specimen: Blood from Arm, Left Updated: 09/03/24 1958     Procalcitonin 0.14 ng/mL     Narrative:      As a Marker for Sepsis (Non-Neonates):    1. <0.5 ng/mL represents a low risk of severe sepsis and/or septic shock.  2. >2 ng/mL represents a high risk of severe sepsis and/or septic shock.    As a Marker for Lower Respiratory Tract Infections that require antibiotic therapy:    PCT on Admission    Antibiotic Therapy       6-12 Hrs later    >0.5                Strongly Recommended  >0.25 - <0.5        Recommended   0.1 - 0.25          Discouraged              Remeasure/reassess PCT  <0.1                Strongly Discouraged     Remeasure/reassess PCT    As 28 day mortality risk marker: \"Change in Procalcitonin Result\" (>80% or <=80%) if Day 0 (or Day 1) and Day 4 values are available. Refer to http://[email or web address]-pct-calculator.com    Change in PCT <=80%  A decrease of PCT levels below or equal to 80% defines a positive change in PCT test result representing a higher risk for 28-day all-cause mortality of patients diagnosed with severe sepsis for septic shock.    Change in PCT >80%  A decrease of PCT levels of more than 80% defines a negative change in PCT result representing a lower risk for 28-day all-cause mortality of patients diagnosed with severe sepsis or septic shock.       Urinalysis With Culture If Indicated - Urine, Clean Catch [[id number]]  (Abnormal) Collected: 09/03/24 2108    Specimen: Urine, Clean Catch Updated: 09/03/24 2116     Color, UA Dark Yellow     Appearance, UA Clear     pH, UA 6.0     Specific Gravity, UA 1.022     Glucose, UA Negative     Ketones, UA Trace     Bilirubin, UA Negative     Blood, UA Small (1+)     Protein,  mg/dL (2+)     Leuk Esterase, UA Negative     Nitrite, UA Negative     " Urobilinogen, UA 1.0 E.U./dL    Narrative:      In absence of clinical symptoms, the presence of pyuria, bacteria, and/or nitrites on the urinalysis result does not correlate with infection.    Urinalysis, Microscopic Only - Urine, Clean Catch [[id number]] Collected: 09/03/24 2108    Specimen: Urine, Clean Catch Updated: 09/03/24 2128     RBC, UA 0-2 /HPF      WBC, UA None Seen /HPF      Comment: Urine culture not indicated.        Bacteria, UA None Seen /HPF      Squamous Epithelial Cells, UA 0-2 /HPF      Hyaline Casts, UA None Seen /LPF      Methodology Manual Light Microscopy             CT Chest With Contrast Diagnostic    Result Date: 9/3/2024  FINAL REPORT TECHNIQUE: null CLINICAL HISTORY: Fever, cough, productive green sputum, chest x-ray negative, rule out pneumoni COMPARISON: null FINDINGS: Exam: CT Chest with IV contrast. Comparison: None Clinical history: Fever, cough, productive green sputum. Findings: No thoracic aortic aneurysm or dissection. No hilar or mediastinal adenopathy. No pericardial fluid collection. No pneumothorax. No pleural fluid collection. No pneumonia Gynecomastia. No thoracic spine compression fractures     Impression: Impression: 1. Left lower lobe pneumonia Authenticated and Electronically Signed by Joana Millan MD on 09/03/2024 09:47:12 PM    CT Abdomen Pelvis With Contrast    Result Date: 9/3/2024  FINAL REPORT TECHNIQUE: null CLINICAL HISTORY: Fever, elevated liver enzymes, rule out gallbladder pathology. COMPARISON: null FINDINGS: Exam: CT Abdomen and Pelvis with contrast Comparison: None Clinical history: Fever, elevated liver enzymes, rule out gallbladder pathology. Findings: Left lower lobe consolidation concerning for pneumonia The liver, spleen and pancreas do not demonstrate any acute process. No gallstones or evidence for acute cholecystitis No choledocholithiasis or biliary obstruction. Normal adrenal glands. No renal calculi or hydronephrosis. Symmetric enhancement  of the kidneys bilaterally No abdominal aortic aneurysm. No small bowel obstruction Appendix is normal in caliber No colitis or diverticulitis. Urinary bladder does not demonstrate stones or wall thickening. No free fluid Small fat containing umbilical hernia     Impression: Impression: 1. Left lower lobe pneumonia. 2. No gallstones or evidence for acute cholecystitis. No biliary obstruction. 3. No liver lesions. Authenticated and Electronically Signed by Joana Millan MD on 09/03/2024 09:46:43 PM    XR Chest 1 View    Result Date: 9/2/2024  PROCEDURE: XR CHEST 1 VW-  HISTORY: fever eval PNA, body aches  COMPARISON: None.  FINDINGS: The heart is normal in size. The lungs are clear. The mediastinum is unremarkable. There is no pneumothorax.  There are no acute osseous abnormalities. Apical lordotic positioning noted.      Impression: No acute cardiopulmonary process.     This report was signed and finalized on 9/2/2024 8:48 AM by Liliana Cruz MD.          MDM     Amount and/or Complexity of Data Reviewed  Clinical lab tests: reviewed  Tests in the radiology section of CPT®: reviewed  Decide to obtain previous medical records or to obtain history from someone other than the patient: yes        Initial impression of presenting illness: This is a 39-year-old male presenting for evaluation of fever cough    DDX: includes but is not limited to: Viral upper respiratory tract infection, viral pharyngitis, strep throat, dehydration, pneumonia, among others    Patient arrives hemodynamically stable, febrile to 102.9, tachycardic at a rate of 124 nontachypneic on hypoxic on room air with vitals interpreted by myself.     Pertinent features from physical exam: Lung sounds clear bilaterally, doubt pneumonia, oropharynx is clear, mild posterior oropharyngeal erythema, no evidence of peritonsillar abscess, neck is supple nontender, no neck rigidity, chin to chest without difficulty.  Cardiac auscultation tachycardic rate,  regular rhythm.  Abdomen soft nontender nonreactive.  Moist mucous membranes..    Initial diagnostic plan: CBC CMP Pro-Krishna respiratory panel urinalysis respiratory panel chest x-ray    Results from initial plan were reviewed and interpreted by me revealing CMP with mild elevation in liver enzymes compared to yesterday, nonobstructive pattern.  Procalcitonin normal, chest x-ray my independent interpretation negative for acute process, CBC is unremarkable no leukocytosis.  Respiratory panel negative, rapid strep negative.      Given mild elevation in liver enzymes, with a negative chest x-ray and continued fevers, will obtain CT imaging of the chest and abdomen pelvis to rule out acute infectious process    Diagnostic information from other sources: Prior record reviewed including prior ED note    Interventions / Re-evaluation: Decadron, Tylenol, Toradol, 1 L fluids, patient overall feeling better, heart rate improved to 105.    Results/clinical rationale were discussed with patient at bedside    Consultations/Discussion of results with other physicians: Discussed with ED attending physician.    Disposition plan: Disposition currently pending CT imaging of the chest abdomen pelvis.  Discussed with ED attending physician, transfer of care to ED attending physician at 9:15 PM.  -----    Final diagnoses:   Pneumonia of left lower lobe due to infectious organism     ED Disposition       ED Disposition   Discharge    Condition   Stable    Comment   --                  Wiley Rodarte DO  09/03/24 5659

## 2024-09-03 NOTE — Clinical Note
[facility] EMERGENCY DEPARTMENT  [address] [address]  Phone: [phone]    Yoni Dumas was seen and treated in our emergency department on 9/3/2024.  He may return to work on 09/09/2024.         Thank you for choosing [facility].    Wiley Rodarte DO

## 2024-09-03 NOTE — TELEPHONE ENCOUNTER
"Caller was seen in the ED yesterday for persistent fevers.  He was diagnosed with a viral illness.  He has been alternating tylenol and motrin tempt still gets up to 104.  He now has a productive cough with green tinted sputum that was not present yesterday.  Reason for Disposition   Patient sounds very sick or weak to the triager  (Exception: Mild weakness and hasn't taken fever medicine.)    Additional Information   Negative: Shock suspected (e.g., cold/pale/clammy skin, too weak to stand, low BP, rapid pulse)   Negative: Difficult to awaken or acting confused (e.g., disoriented, slurred speech)   Negative: [1] Difficulty breathing AND [2] bluish lips, tongue or face   Negative: New-onset rash with multiple purple (or blood-colored) spots or dots   Negative: Sounds like a life-threatening emergency to the triager   Negative: Fever in a cancer patient who is currently (or recently) receiving chemotherapy or radiation therapy, or cancer patient who has metastatic or end-stage cancer and is receiving palliative care   Negative: Pregnant   Negative: Postpartum (from 0 to 6 weeks after delivery)   Negative: Fever onset within 24 hours of receiving vaccine   Negative: [1] Fever AND [2] within 14 days of COVID-19 Exposure   Negative: Other symptom is present, see that guideline (e.g., symptoms of cough, runny nose, sore throat, earache, abdominal pain, diarrhea, vomiting)   Negative: [1] Headache AND [2] stiff neck (can't touch chin to chest)   Negative: Difficulty breathing   Negative: IV Drug Use (IVDU)   Negative: [1] Drinking very little AND [2] dehydration suspected (e.g., no urine > 12 hours, very dry mouth, very lightheaded)   Negative: Widespread rash and cause unknown    Answer Assessment - Initial Assessment Questions  1. TEMPERATURE: \"What is the most recent temperature?\"  \"How was it measured?\"       104  2. ONSET: \"When did the fever start?\"       Off and on since Saturday  3. CHILLS: \"Do you have chills?\" " "If yes: \"How bad are they?\"  (e.g., none, mild, moderate, severe)    - NONE: no chills    - MILD: feeling cold    - MODERATE: feeling very cold, some shivering (feels better under a thick blanket)    - SEVERE: feeling extremely cold with shaking chills (general body shaking, rigors; even under a thick blanket)       mod  4. OTHER SYMPTOMS: \"Do you have any other symptoms besides the fever?\"  (e.g., abdomen pain, cough, diarrhea, earache, headache, sore throat, urination pain)      cough  5. CAUSE: If there are no symptoms, ask: \"What do you think is causing the fever?\"       Not sure  6. CONTACTS: \"Does anyone else in the family have an infection?\"      no  7. TREATMENT: \"What have you done so far to treat this fever?\" (e.g., medications)      Was seen in the ED and told to alt tylenol and motrin, have been doing  8. IMMUNOCOMPROMISE: \"Do you have of the following: diabetes, HIV positive, splenectomy, cancer chemotherapy, chronic steroid treatment, transplant patient, etc.\"      no  9. PREGNANCY: \"Is there any chance you are pregnant?\" \"When was your last menstrual period?\"      na  10. TRAVEL: \"Have you traveled out of the country in the last month?\" (e.g., travel history, exposures)        no    Protocols used: Fever-ADULT-AH    "

## 2024-09-03 NOTE — Clinical Note
[facility] EMERGENCY DEPARTMENT  [address] [address]  Phone: [phone]    Yoni Dumas was seen and treated in our emergency department on 9/3/2024.  He may return to work on 09/09/2024.         Thank you for choosing [facility].    Wiley Rodarte DO

## 2024-09-05 LAB — BACTERIA SPEC AEROBE CULT: NORMAL

## 2024-09-16 ENCOUNTER — NURSE TRIAGE (OUTPATIENT)
Dept: CALL CENTER | Facility: HOSPITAL | Age: 39
End: 2024-09-16
Payer: COMMERCIAL